# Patient Record
Sex: MALE | Race: BLACK OR AFRICAN AMERICAN | NOT HISPANIC OR LATINO | URBAN - METROPOLITAN AREA
[De-identification: names, ages, dates, MRNs, and addresses within clinical notes are randomized per-mention and may not be internally consistent; named-entity substitution may affect disease eponyms.]

---

## 2021-12-28 ENCOUNTER — LAB OUTSIDE AN ENCOUNTER (OUTPATIENT)
Dept: URBAN - METROPOLITAN AREA CLINIC 118 | Facility: CLINIC | Age: 61
End: 2021-12-28

## 2021-12-28 ENCOUNTER — DASHBOARD ENCOUNTERS (OUTPATIENT)
Age: 61
End: 2021-12-28

## 2021-12-28 ENCOUNTER — OFFICE VISIT (OUTPATIENT)
Dept: URBAN - METROPOLITAN AREA CLINIC 118 | Facility: CLINIC | Age: 61
End: 2021-12-28
Payer: COMMERCIAL

## 2021-12-28 DIAGNOSIS — R10.84 ABDOMINAL PAIN, GENERALIZED: ICD-10-CM

## 2021-12-28 DIAGNOSIS — E80.4 GILBERT SYNDROME: ICD-10-CM

## 2021-12-28 DIAGNOSIS — B17.9 ACUTE HEPATITIS: ICD-10-CM

## 2021-12-28 DIAGNOSIS — R79.89 LFT ELEVATION: ICD-10-CM

## 2021-12-28 PROCEDURE — 99204 OFFICE O/P NEW MOD 45 MIN: CPT | Performed by: INTERNAL MEDICINE

## 2021-12-28 NOTE — HPI-TODAY'S VISIT:
pt h/o GIlbert's disease now presents for increased LFT's. Pt reports recently noted increase T. bili (6.3) with labs drawn at [facility]. Other LFT's normal. Pt reprots known h/o Gilbert's disease in the past. Denies any recent diet or med changes. Reports non-specific abdominal pain without bowel changes including diarrhea, constipation or rectal bleeding. No weight loss or anemia. pt notes otherwsie feeling well.

## 2021-12-29 LAB
ALBUMIN: 4.5
ALKALINE PHOSPHATASE: 54
ALT (SGPT): 24
AST (SGOT): 25
BILIRUBIN, DIRECT: 2.37
BILIRUBIN, TOTAL: 4
FERRITIN, SERUM: 93
HAPTOGLOBIN: 172
HBSAG SCREEN: NEGATIVE
HCV AB: 0.2
HEP A AB, IGM: NEGATIVE
HEP B CORE AB, IGM: NEGATIVE
INTERPRETATION:: (no result)
PROTEIN, TOTAL: 7.9

## 2022-01-05 PROBLEM — 27503000: Status: ACTIVE | Noted: 2021-12-28
